# Patient Record
Sex: MALE | Race: OTHER | NOT HISPANIC OR LATINO
[De-identification: names, ages, dates, MRNs, and addresses within clinical notes are randomized per-mention and may not be internally consistent; named-entity substitution may affect disease eponyms.]

---

## 2022-05-05 PROBLEM — Z00.00 ENCOUNTER FOR PREVENTIVE HEALTH EXAMINATION: Status: ACTIVE | Noted: 2022-05-05

## 2022-05-09 ENCOUNTER — APPOINTMENT (OUTPATIENT)
Dept: UROLOGY | Facility: CLINIC | Age: 77
End: 2022-05-09
Payer: MEDICARE

## 2022-05-09 VITALS
DIASTOLIC BLOOD PRESSURE: 83 MMHG | HEART RATE: 104 BPM | WEIGHT: 192 LBS | HEIGHT: 69 IN | SYSTOLIC BLOOD PRESSURE: 119 MMHG | BODY MASS INDEX: 28.44 KG/M2

## 2022-05-09 DIAGNOSIS — I25.84 ATHEROSCLEROTIC HEART DISEASE OF NATIVE CORONARY ARTERY W/OUT ANGINA PECTORIS: ICD-10-CM

## 2022-05-09 DIAGNOSIS — Z80.6 FAMILY HISTORY OF LEUKEMIA: ICD-10-CM

## 2022-05-09 DIAGNOSIS — F17.210 NICOTINE DEPENDENCE, CIGARETTES, UNCOMPLICATED: ICD-10-CM

## 2022-05-09 DIAGNOSIS — Z78.9 OTHER SPECIFIED HEALTH STATUS: ICD-10-CM

## 2022-05-09 DIAGNOSIS — Z82.49 FAMILY HISTORY OF ISCHEMIC HEART DISEASE AND OTHER DISEASES OF THE CIRCULATORY SYSTEM: ICD-10-CM

## 2022-05-09 DIAGNOSIS — Z85.118 PERSONAL HISTORY OF OTHER MALIGNANT NEOPLASM OF BRONCHUS AND LUNG: ICD-10-CM

## 2022-05-09 DIAGNOSIS — E78.00 PURE HYPERCHOLESTEROLEMIA, UNSPECIFIED: ICD-10-CM

## 2022-05-09 DIAGNOSIS — K31.9 DISEASE OF STOMACH AND DUODENUM, UNSPECIFIED: ICD-10-CM

## 2022-05-09 DIAGNOSIS — I25.10 ATHEROSCLEROTIC HEART DISEASE OF NATIVE CORONARY ARTERY W/OUT ANGINA PECTORIS: ICD-10-CM

## 2022-05-09 PROCEDURE — 99204 OFFICE O/P NEW MOD 45 MIN: CPT

## 2022-05-09 RX ORDER — SIMVASTATIN 10 MG/1
10 TABLET, FILM COATED ORAL
Refills: 0 | Status: COMPLETED | COMMUNITY

## 2022-05-09 RX ORDER — SIMVASTATIN 20 MG/1
20 TABLET, FILM COATED ORAL
Qty: 90 | Refills: 0 | Status: ACTIVE | COMMUNITY
Start: 2022-01-05

## 2022-05-09 RX ORDER — LEVOFLOXACIN 500 MG/1
500 TABLET, FILM COATED ORAL
Qty: 7 | Refills: 0 | Status: ACTIVE | COMMUNITY
Start: 2022-05-05

## 2022-05-09 RX ORDER — CLOPIDOGREL KIT
75 & 81 KIT
Refills: 0 | Status: ACTIVE | COMMUNITY

## 2022-05-09 RX ORDER — OMEPRAZOLE 40 MG/1
40 CAPSULE, DELAYED RELEASE ORAL
Qty: 90 | Refills: 0 | Status: COMPLETED | COMMUNITY
Start: 2022-01-05

## 2022-05-09 RX ORDER — PHENAZOPYRIDINE HYDROCHLORIDE 200 MG/1
200 TABLET ORAL
Qty: 14 | Refills: 0 | Status: COMPLETED | COMMUNITY
Start: 2022-04-15

## 2022-05-09 RX ORDER — PHENAZOPYRIDINE HYDROCHLORIDE 100 MG/1
100 TABLET ORAL
Qty: 6 | Refills: 0 | Status: ACTIVE | COMMUNITY
Start: 2022-05-09 | End: 1900-01-01

## 2022-05-09 RX ORDER — UMECLIDINIUM BROMIDE AND VILANTEROL TRIFENATATE 62.5; 25 UG/1; UG/1
62.5-25 POWDER RESPIRATORY (INHALATION)
Qty: 60 | Refills: 0 | Status: ACTIVE | COMMUNITY
Start: 2022-04-29

## 2022-05-09 RX ORDER — IBUPROFEN 600 MG/1
600 TABLET, FILM COATED ORAL
Qty: 42 | Refills: 0 | Status: ACTIVE | COMMUNITY
Start: 2022-05-03

## 2022-05-09 RX ORDER — BUSPIRONE HYDROCHLORIDE 5 MG/1
5 TABLET ORAL
Qty: 60 | Refills: 0 | Status: COMPLETED | COMMUNITY
Start: 2021-10-20

## 2022-05-09 RX ORDER — ASPIRIN 81 MG/1
81 TABLET ORAL
Refills: 0 | Status: ACTIVE | COMMUNITY

## 2022-05-13 ENCOUNTER — APPOINTMENT (OUTPATIENT)
Dept: UROLOGY | Facility: CLINIC | Age: 77
End: 2022-05-13
Payer: MEDICARE

## 2022-05-13 VITALS
HEART RATE: 94 BPM | SYSTOLIC BLOOD PRESSURE: 107 MMHG | HEIGHT: 69 IN | DIASTOLIC BLOOD PRESSURE: 73 MMHG | BODY MASS INDEX: 28.58 KG/M2 | WEIGHT: 193 LBS

## 2022-05-13 PROCEDURE — 99214 OFFICE O/P EST MOD 30 MIN: CPT

## 2022-05-13 RX ORDER — AMOXICILLIN 500 MG/1
500 CAPSULE ORAL TWICE DAILY
Qty: 14 | Refills: 0 | Status: ACTIVE | COMMUNITY
Start: 2022-05-13 | End: 1900-01-01

## 2022-05-13 NOTE — LETTER BODY
[Dear  ___] : Dear  [unfilled], [Consult Letter:] : I had the pleasure of evaluating your patient, [unfilled]. [Please see my note below.] : Please see my note below. [Consult Closing:] : Thank you very much for allowing me to participate in the care of this patient.  If you have any questions, please do not hesitate to contact me. [Sincerely,] : Sincerely, [FreeTextEntry2] : Miguel Caruso MD\par [address]\par [place] [id number]

## 2022-05-13 NOTE — HISTORY OF PRESENT ILLNESS
[FreeTextEntry1] : Ronak is a 76-year-old male born October 3, 1945 who about 2 weeks ago started noticing some swelling and discomfort in the right testicle (he only has 1 since birth).  The pain progressed he started having urgency frequency he went to [facility] he was diagnosed as a UTI, he does not have any of the labs and he says no ultrasound was done of the right testicle.  He was discharged home on Levaquin and tells me that the pain is better but still quite significant and he would like some additional help\par \par To go back about a month he said for the most part he has no trouble with urination he might wake up once at night for the most part he feels he is urinating well.\par \par Please note to the last 4 years she has had no erections at all, the last time he had sex without any difficulty was longer than that he has not spoken to anyone about it but if we can fix it he would be happy and he thinks his wife would agree. [Urinary Urgency] : urinary urgency [Urinary Frequency] : urinary frequency [Nocturia] : nocturia [Straining] : straining [Weak Stream] : weak stream [Erectile Dysfunction] : Erectile Dysfunction [5] : 5 [None] : There is no radiation [Sharp] : sharp [Gradual] : gradual [___ Week(s) Ago] : [unfilled] week(s) ago [Constant] : constantly [Moderate] : moderate in severity [Improving] : improving [de-identified] : right testicle [de-identified] : levaquin

## 2022-05-13 NOTE — PHYSICAL EXAM
[General Appearance - Well Developed] : well developed [General Appearance - Well Nourished] : well nourished [Normal Appearance] : normal appearance [Well Groomed] : well groomed [General Appearance - In No Acute Distress] : no acute distress [Heart Rate And Rhythm] : Heart rate and rhythm were normal [Respiration, Rhythm And Depth] : normal respiratory rhythm and effort [Exaggerated Use Of Accessory Muscles For Inspiration] : no accessory muscle use [Abdomen Soft] : soft [Abdomen Tenderness] : non-tender [Abdomen Hernia] : no hernia was discovered [Costovertebral Angle Tenderness] : no ~M costovertebral angle tenderness [Urethral Meatus] : meatus normal [Penis Abnormality] : normal circumcised penis [Epididymis] : the epididymides were normal [Testes Tenderness] : no tenderness of the testes [Prostate Enlargement] : the prostate was not enlarged [Prostate Tenderness] : the prostate was not tender [Prostate Size ___ gm] : prostate size [unfilled] gm [Prostate Size ___ (0-4)] : prostate size [unfilled] (scale: 0-4) [Normal Station and Gait] : the gait and station were normal for the patient's age [] : no rash [FreeTextEntry1] : DTR's & BC reflexes were intact  [Oriented To Time, Place, And Person] : oriented to person, place, and time [Affect] : the affect was normal [Mood] : the mood was normal [Not Anxious] : not anxious

## 2022-05-13 NOTE — HISTORY OF PRESENT ILLNESS
[Currently Experiencing ___] :  [unfilled] [Urinary Urgency] : urinary urgency [Urinary Frequency] : urinary frequency [Nocturia] : nocturia [Straining] : straining [Weak Stream] : weak stream [Erectile Dysfunction] : Erectile Dysfunction [5] : 5 [Sharp] : sharp [Aching] : aching [Gradual] : gradual [___ Week(s) Ago] : [unfilled] week(s) ago [Constant] : constantly [Moderate] : moderate in severity [Improving] : improving [FreeTextEntry1] : Ronak is a 76-year-old male, with a history of congenitally absent left testicle, presents for evaluation of right testicular pain and discomfort with dysuria.\par \par He initially presented to [facility] where he was discharged on Levaquin for presumptive right epididymal orchitis.  He was improving however, discontinue antibiotics as he ran out of medication.  He states that after he discontinued his pain returned and is now worsening as well as worsening dysuria.\par \delta Additionally has a history of erectile dysfunction with difficulty obtaining and maintaining an erection with low libido.  We sent him for hormone testing needed [place] criteria classification, the latter he did not yet get.  He presents to review his blood work.\par \par He presents today to review his blood work as well as a report and if possible the disc of the imaging he had at [facility].  He has pending lab reports he said he did not able to get that\par \par  [de-identified] : right testicle [de-identified] : Suprapubic region

## 2022-05-13 NOTE — LETTER HEADER
[FreeTextEntry3] : Avinash Car M.D.\par Director Emeritus of Urology\par [facility]\par [address] [place] [address], [address]\[place] [address]

## 2022-05-13 NOTE — PHYSICAL EXAM
[General Appearance - Well Developed] : well developed [General Appearance - Well Nourished] : well nourished [Normal Appearance] : normal appearance [Well Groomed] : well groomed [General Appearance - In No Acute Distress] : no acute distress [Abdomen Soft] : soft [Abdomen Tenderness] : non-tender [Costovertebral Angle Tenderness] : no ~M costovertebral angle tenderness [Urethral Meatus] : meatus normal [Penis Abnormality] : normal circumcised penis [Urinary Bladder Findings] : the bladder was normal on palpation [Scrotum] : the scrotum was normal [Testes Mass (___cm)] : there were no testicular masses [Edema] : no peripheral edema [] : no respiratory distress [Respiration, Rhythm And Depth] : normal respiratory rhythm and effort [Exaggerated Use Of Accessory Muscles For Inspiration] : no accessory muscle use [Oriented To Time, Place, And Person] : oriented to person, place, and time [Affect] : the affect was normal [Mood] : the mood was normal [Not Anxious] : not anxious [Normal Station and Gait] : the gait and station were normal for the patient's age [No Focal Deficits] : no focal deficits [Femoral Lymph Nodes Enlarged Bilaterally] : femoral [Inguinal Lymph Nodes Enlarged Bilaterally] : inguinal [FreeTextEntry1] : There is no tenderness to the lateral and medial compression of the calf there is no pain in the tendon with dorsiflexion of the ankle and there is no palpable lump and no tenderness behind the knee on the left side that is bothering him

## 2022-05-13 NOTE — LETTER HEADER
[FreeTextEntry3] : Avinash Car M.D.\par Director Emeritus of Urology\par [facility]\par [address] [place] [address], [address]\[place] [address]

## 2022-05-13 NOTE — ASSESSMENT
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  The pain is improved but is still bothering him and he wants to know what I can switch him to.  I explained that switching to a different antibiotic after 4 days without knowing the culture report is not good medicine.  He appears to be responding and for all I know I could be switching him from something he is sensitive to the something he is not.  He ready has the risks of a fluoroquinolone where he can has to be careful in the next month not to strain any of his tendons because they can rip, there is already the risk of the neurotoxicity and the risks of C. difficile so I hate to give him another antibiotic which has its own risk without knowing for sure.\par \par What I can give him to some Pyridium which will help with the dysuria but it will not help the pain in the testicle, he can take some over-the-counter medication such as Tylenol or Aleve we will send urine for culture today and I can bring him back Friday by which time I will have the culture report.\par \par With respect to his ED he only has 1 testicle, it feels somewhat atrophic so I will get hormones.  Given his dyspnea at rest, his history of 3 stents 2 years ago, and his age I am going to want [place] criteria classification prior to considering any medication to help his erections

## 2022-05-13 NOTE — REVIEW OF SYSTEMS
[Shortness Of Breath] : shortness of breath [see HPI] : see HPI [Erectile Dysfunction] : erectile dysfunction [Negative] : Heme/Lymph

## 2022-05-13 NOTE — LETTER BODY
[Dear  ___] : Dear  [unfilled], [Consult Letter:] : I had the pleasure of evaluating your patient, [unfilled]. [Please see my note below.] : Please see my note below. [Consult Closing:] : Thank you very much for allowing me to participate in the care of this patient.  If you have any questions, please do not hesitate to contact me. [Sincerely,] : Sincerely, [FreeTextEntry2] : Miguel Caruso MD\par [address]\par [place] [id number]

## 2022-05-13 NOTE — ASSESSMENT
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  The pain is improved but is still bothering him and he wants to know what I can switch him to.  I explained that switching to a different antibiotic after 4 days without knowing the culture report is not good medicine.  He appears to be responding and for all I know I could be switching him from something he is sensitive to the something he is not.  He ready has the risks of a fluoroquinolone where he can has to be careful in the next month not to strain any of his tendons because they can rip, there is already the risk of the neurotoxicity and the risks of C. difficile so I hate to give him another antibiotic which has its own risk without knowing for sure.\par \par What I can give him to some Pyridium which will help with the dysuria but it will not help the pain in the testicle, he can take some over-the-counter medication such as Tylenol or Aleve we will send urine for culture today and I can bring him back Friday by which time I will have the culture report.\par \par With respect to his ED he only has 1 testicle, it feels somewhat atrophic so I will get hormones.  Given his dyspnea at rest, his history of 3 stents 2 years ago, and his age I am going to want [place] criteria classification prior to considering any medication to help his erections

## 2022-05-13 NOTE — LETTER HEADER
[FreeTextEntry3] : Avinash Car M.D.\par Director Emeritus of Urology\par [facility]\par [address] [place] [address], [address]\[place] [address]

## 2022-05-13 NOTE — HISTORY OF PRESENT ILLNESS
[FreeTextEntry1] : Ronak is a 76-year-old male born October 3, 1945 who about 2 weeks ago started noticing some swelling and discomfort in the right testicle (he only has 1 since birth).  The pain progressed he started having urgency frequency he went to [facility] he was diagnosed as a UTI, he does not have any of the labs and he says no ultrasound was done of the right testicle.  He was discharged home on Levaquin and tells me that the pain is better but still quite significant and he would like some additional help\par \par To go back about a month he said for the most part he has no trouble with urination he might wake up once at night for the most part he feels he is urinating well.\par \par Please note to the last 4 years she has had no erections at all, the last time he had sex without any difficulty was longer than that he has not spoken to anyone about it but if we can fix it he would be happy and he thinks his wife would agree. [Urinary Urgency] : urinary urgency [Urinary Frequency] : urinary frequency [Nocturia] : nocturia [Straining] : straining [Weak Stream] : weak stream [Erectile Dysfunction] : Erectile Dysfunction [5] : 5 [None] : There is no radiation [Sharp] : sharp [Gradual] : gradual [___ Week(s) Ago] : [unfilled] week(s) ago [Constant] : constantly [Moderate] : moderate in severity [Improving] : improving [de-identified] : right testicle [de-identified] : levaquin

## 2022-05-13 NOTE — LETTER BODY
[Dear  ___] : Dear  [unfilled], [Please see my note below.] : Please see my note below. [Sincerely,] : Sincerely, [Courtesy Letter:] : I had the pleasure of seeing your patient, [unfilled], in my office today. [FreeTextEntry2] : Miguel Caruso MD\par [address]\[place] [address]\par \par [phone]

## 2022-05-13 NOTE — ASSESSMENT
[FreeTextEntry1] : His symptoms have worsened after discontinuing Levaquin.  He is tender at the right epididymis.  Urine shows cocci under the microscope.  We will send UA C&S today and start him on amoxicillin twice daily empirically.  He realizes this may not be needed and/or may be the wrong medication but he has a slight white count he is 76 years old and subjectively is getting worse.  All usage, dosage, mechanism of action, and adverse events reviewed.  Follow-up next week for symptom index and will be contacted if his antibiotic needs to be changed.\par \par Concerning his testosterone, he will obtain repeat hormone panel as his initial panel demonstrated low testosterone.  We still need the [place] criteria classification as well\par \par With respect to the pain in his left calf, especially with him on Levaquin I had concern for tendon injuries.  I found no evidence of that.  There was no tenderness to palpation or compression of the thigh and no evidence of the DVT.  I have asked him to discuss with his primary care doctor

## 2022-05-16 ENCOUNTER — NON-APPOINTMENT (OUTPATIENT)
Age: 77
End: 2022-05-16

## 2022-05-16 LAB — BACTERIA UR CULT: NORMAL

## 2022-05-18 ENCOUNTER — APPOINTMENT (OUTPATIENT)
Dept: UROLOGY | Facility: CLINIC | Age: 77
End: 2022-05-18
Payer: MEDICARE

## 2022-05-18 VITALS
HEART RATE: 91 BPM | DIASTOLIC BLOOD PRESSURE: 70 MMHG | SYSTOLIC BLOOD PRESSURE: 103 MMHG | HEIGHT: 69 IN | BODY MASS INDEX: 28.29 KG/M2 | WEIGHT: 191 LBS

## 2022-05-18 DIAGNOSIS — Z87.898 PERSONAL HISTORY OF OTHER SPECIFIED CONDITIONS: ICD-10-CM

## 2022-05-18 DIAGNOSIS — N39.0 URINARY TRACT INFECTION, SITE NOT SPECIFIED: ICD-10-CM

## 2022-05-18 DIAGNOSIS — R79.89 OTHER SPECIFIED ABNORMAL FINDINGS OF BLOOD CHEMISTRY: ICD-10-CM

## 2022-05-18 DIAGNOSIS — N45.2 ORCHITIS: ICD-10-CM

## 2022-05-18 DIAGNOSIS — R10.30 LOWER ABDOMINAL PAIN, UNSPECIFIED: ICD-10-CM

## 2022-05-18 DIAGNOSIS — N52.9 MALE ERECTILE DYSFUNCTION, UNSPECIFIED: ICD-10-CM

## 2022-05-18 DIAGNOSIS — A49.9 URINARY TRACT INFECTION, SITE NOT SPECIFIED: ICD-10-CM

## 2022-05-18 PROCEDURE — 99214 OFFICE O/P EST MOD 30 MIN: CPT

## 2022-05-18 NOTE — LETTER HEADER
[FreeTextEntry3] : Avinash Car M.D.\par Director Emeritus of Urology\par [facility]\par [address] [place] [address], [address]\[place] [address]

## 2022-05-18 NOTE — HISTORY OF PRESENT ILLNESS
[FreeTextEntry1] : Bebeto is a 76-year-old male born October 3, 1945 last seen on May 13, 2022 after a visit May 9, 2022 with a very tender right testicle, he has known left testicle since birth, and dysuria.  [facility] have discharged him on Levaquin he had run out of them the pain came back.  The ultrasound done at [facility] showed no signs of endocarditis, the exam showed a tender right testicle, we started him on amoxicillin empirically and he comes [place] in the testicle no longer bothers him but he has severe lower abdominal pain.  He has a strong history of abdominal issues, and he wonders if this is true true and not related to his urological problems.\par \par He also has ED we sent him for hormone studies which were low we sent him for repeat we also wanted [place] criteria classification [place] which is still pending\par \par He also had pain in the left calf we did not find any evidence of a DVT but I asked him to contact his primary care physician.  He tells me he did not but its gotten better on its own. [Urinary Urgency] : urinary urgency [Urinary Frequency] : urinary frequency [Nocturia] : nocturia [Straining] : straining [Weak Stream] : weak stream [Erectile Dysfunction] : Erectile Dysfunction [Abdominal Pain] : abdominal pain [10] : 10 [Lower Abdomen] : lower abdomen [Sharp] : sharp [Gradual] : gradual [Constant] : constantly [Moderate] : moderate in severity [Worsening] : worsening [None] : No relieving factors are noted [de-identified] : sATURDAY

## 2022-05-18 NOTE — ASSESSMENT
[FreeTextEntry1] : With respect to the abdominal pain I do not know what it is but I cannot believe that he has had no growth culture if the bladder is causing him this discomfort and this sounds more like the GI tract.  I am having him go see his PCP and he may need him to see a general surgeon or his gastroenterologist\par \par As far as his low testosterone it is documented and I would start him on TRT but I do not want to add a medicine to the mix until I know what is going on with his abdomen he has had this for a while and to put on a new medicine until we know what is going on here I think is fluids.\par \par As far as the right testicular pain/orchitis, it has resolved.  I am not sure what it was as the ultrasound was negative but it is gotten better and unless it recurs unlikely to worry about it\par \par Various options for TRT, please see below we will reviewed he will think about them and wants his abdominal discomfort is resolved we will hold off.  He will contact us once that is resolved, he will send me an email so I can get him an appointment as soon as possible\par \par what testosterone replacement does he want\par Gels-easy to apply but he have to wait for them to dry they have an older and he have to worry about the risk of transference\par Injections\par      Intramuscular the cheapest but hurts\par      Subcutaneous i.e. Xyosted works very well that expensive\par      Pellets which worked well but the last 3 to 4 months and before we put him on that I want to make sure his body tolerates normal testosterone levels\par      Pills very expensive and requires a lot of compliance\par      Intranasal which requires dosing 3 times a day make sure that his use it does not suppress the pituitary but compliance is poor he needs not worried about the pituitary\par       Patches both cutaneous and mucosal both cutaneous and mucosal can cause an irritation and are not very well appreciated by the patient's \par \par as far as his erections, at the very least I believe until we have a within normal testosterone, once his stomach problems are resolved he will go to his doctor get me [place] criteria and if it still an issue we will discuss options such as PDE 5 inhibitors intracavernosal injection etc.

## 2022-05-18 NOTE — PHYSICAL EXAM
[General Appearance - Well Developed] : well developed [General Appearance - Well Nourished] : well nourished [Normal Appearance] : normal appearance [Well Groomed] : well groomed [General Appearance - In No Acute Distress] : no acute distress [Heart Rate And Rhythm] : Heart rate and rhythm were normal [FreeTextEntry1] : mild edema [] : no respiratory distress [Respiration, Rhythm And Depth] : normal respiratory rhythm and effort [Exaggerated Use Of Accessory Muscles For Inspiration] : no accessory muscle use [Oriented To Time, Place, And Person] : oriented to person, place, and time [Affect] : the affect was normal [Mood] : the mood was normal [Not Anxious] : not anxious [Normal Station and Gait] : the gait and station were normal for the patient's age

## 2022-05-18 NOTE — LETTER BODY
[Dear  ___] : Dear  [unfilled], [Courtesy Letter:] : I had the pleasure of seeing your patient, [unfilled], in my office today. [Please see my note below.] : Please see my note below. [Sincerely,] : Sincerely, [FreeTextEntry2] : Miguel Caruso MD\par [address]\[place] [address]\par \par [phone]

## 2022-07-15 ENCOUNTER — APPOINTMENT (OUTPATIENT)
Dept: UROLOGY | Facility: CLINIC | Age: 77
End: 2022-07-15

## 2025-06-20 NOTE — PHYSICAL EXAM
[General Appearance - Well Developed] : well developed [General Appearance - Well Nourished] : well nourished [Normal Appearance] : normal appearance [Well Groomed] : well groomed [General Appearance - In No Acute Distress] : no acute distress [Heart Rate And Rhythm] : Heart rate and rhythm were normal [Respiration, Rhythm And Depth] : normal respiratory rhythm and effort [Exaggerated Use Of Accessory Muscles For Inspiration] : no accessory muscle use [Abdomen Soft] : soft [Abdomen Tenderness] : non-tender [Abdomen Hernia] : no hernia was discovered [Costovertebral Angle Tenderness] : no ~M costovertebral angle tenderness [Urethral Meatus] : meatus normal [Penis Abnormality] : normal circumcised penis [Epididymis] : the epididymides were normal [Testes Tenderness] : no tenderness of the testes [Prostate Enlargement] : the prostate was not enlarged [Prostate Tenderness] : the prostate was not tender [Prostate Size ___ gm] : prostate size [unfilled] gm [Prostate Size ___ (0-4)] : prostate size [unfilled] (scale: 0-4) [Normal Station and Gait] : the gait and station were normal for the patient's age [FreeTextEntry1] : DTR's & BC reflexes were intact  [] : no rash [Oriented To Time, Place, And Person] : oriented to person, place, and time [Affect] : the affect was normal [Mood] : the mood was normal [Not Anxious] : not anxious Yes